# Patient Record
Sex: FEMALE | Race: WHITE | Employment: UNEMPLOYED | URBAN - METROPOLITAN AREA
[De-identification: names, ages, dates, MRNs, and addresses within clinical notes are randomized per-mention and may not be internally consistent; named-entity substitution may affect disease eponyms.]

---

## 2019-02-13 ENCOUNTER — HOSPITAL ENCOUNTER (INPATIENT)
Age: 29
LOS: 1 days | Discharge: HOME | DRG: 897 | End: 2019-02-14
Payer: COMMERCIAL

## 2019-02-13 PROBLEM — F32.9 MAJOR DEPRESSIVE DISORDER: Status: ACTIVE | Noted: 2019-02-13

## 2019-02-13 PROBLEM — F15.980: Status: ACTIVE | Noted: 2019-02-13

## 2019-02-13 PROBLEM — F33.9: Status: ACTIVE | Noted: 2019-02-13

## 2019-02-13 PROCEDURE — 74011250637 HC RX REV CODE- 250/637: Performed by: PSYCHIATRY & NEUROLOGY

## 2019-02-13 PROCEDURE — 65220000003 HC RM SEMIPRIVATE PSYCH

## 2019-02-13 PROCEDURE — 74011250637 HC RX REV CODE- 250/637: Performed by: INTERNAL MEDICINE

## 2019-02-13 PROCEDURE — 99284 EMERGENCY DEPT VISIT MOD MDM: CPT

## 2019-02-13 RX ORDER — BUDESONIDE AND FORMOTEROL FUMARATE DIHYDRATE 160; 4.5 UG/1; UG/1
2 AEROSOL RESPIRATORY (INHALATION) 2 TIMES DAILY
COMMUNITY

## 2019-02-13 RX ORDER — BUDESONIDE AND FORMOTEROL FUMARATE DIHYDRATE 160; 4.5 UG/1; UG/1
2 AEROSOL RESPIRATORY (INHALATION) 2 TIMES DAILY
Status: ON HOLD | COMMUNITY
End: 2019-02-13

## 2019-02-13 RX ORDER — ALBUTEROL SULFATE 90 UG/1
2 AEROSOL, METERED RESPIRATORY (INHALATION)
Status: DISCONTINUED | OUTPATIENT
Start: 2019-02-13 | End: 2019-02-14 | Stop reason: HOSPADM

## 2019-02-13 RX ORDER — BENZTROPINE MESYLATE 1 MG/ML
2 INJECTION INTRAMUSCULAR; INTRAVENOUS
Status: DISCONTINUED | OUTPATIENT
Start: 2019-02-13 | End: 2019-02-14 | Stop reason: HOSPADM

## 2019-02-13 RX ORDER — ADHESIVE BANDAGE
30 BANDAGE TOPICAL DAILY PRN
Status: DISCONTINUED | OUTPATIENT
Start: 2019-02-13 | End: 2019-02-14 | Stop reason: HOSPADM

## 2019-02-13 RX ORDER — BENZTROPINE MESYLATE 2 MG/1
2 TABLET ORAL
Status: DISCONTINUED | OUTPATIENT
Start: 2019-02-13 | End: 2019-02-14 | Stop reason: HOSPADM

## 2019-02-13 RX ORDER — FLUOXETINE HYDROCHLORIDE 20 MG/1
60 CAPSULE ORAL DAILY
Status: ON HOLD | COMMUNITY
End: 2019-02-13

## 2019-02-13 RX ORDER — FLUTICASONE PROPIONATE AND SALMETEROL 100; 50 UG/1; UG/1
1 POWDER RESPIRATORY (INHALATION)
Status: DISCONTINUED | OUTPATIENT
Start: 2019-02-13 | End: 2019-02-14 | Stop reason: HOSPADM

## 2019-02-13 RX ORDER — IBUPROFEN 400 MG/1
400 TABLET ORAL
Status: DISCONTINUED | OUTPATIENT
Start: 2019-02-13 | End: 2019-02-14 | Stop reason: HOSPADM

## 2019-02-13 RX ORDER — LORAZEPAM 2 MG/ML
2 INJECTION INTRAMUSCULAR
Status: DISCONTINUED | OUTPATIENT
Start: 2019-02-13 | End: 2019-02-14 | Stop reason: HOSPADM

## 2019-02-13 RX ORDER — FLUOXETINE HYDROCHLORIDE 20 MG/1
20 CAPSULE ORAL DAILY
Status: DISCONTINUED | OUTPATIENT
Start: 2019-02-13 | End: 2019-02-14 | Stop reason: HOSPADM

## 2019-02-13 RX ORDER — OLANZAPINE 5 MG/1
5 TABLET ORAL
Status: DISCONTINUED | OUTPATIENT
Start: 2019-02-13 | End: 2019-02-14 | Stop reason: HOSPADM

## 2019-02-13 RX ORDER — LORAZEPAM 1 MG/1
1 TABLET ORAL
Status: DISCONTINUED | OUTPATIENT
Start: 2019-02-13 | End: 2019-02-14 | Stop reason: HOSPADM

## 2019-02-13 RX ORDER — TRAMADOL HYDROCHLORIDE 50 MG/1
50 TABLET ORAL
COMMUNITY
End: 2019-02-14

## 2019-02-13 RX ORDER — ZOLPIDEM TARTRATE 10 MG/1
10 TABLET ORAL
Status: DISCONTINUED | OUTPATIENT
Start: 2019-02-13 | End: 2019-02-14 | Stop reason: HOSPADM

## 2019-02-13 RX ORDER — ACETAMINOPHEN 325 MG/1
650 TABLET ORAL
Status: DISCONTINUED | OUTPATIENT
Start: 2019-02-13 | End: 2019-02-14 | Stop reason: HOSPADM

## 2019-02-13 RX ORDER — ALBUTEROL SULFATE 90 UG/1
2 AEROSOL, METERED RESPIRATORY (INHALATION)
COMMUNITY

## 2019-02-13 RX ORDER — IBUPROFEN 200 MG
1 TABLET ORAL
Status: DISCONTINUED | OUTPATIENT
Start: 2019-02-13 | End: 2019-02-14 | Stop reason: HOSPADM

## 2019-02-13 RX ADMIN — LORAZEPAM 1 MG: 1 TABLET ORAL at 14:12

## 2019-02-13 RX ADMIN — FLUTICASONE PROPIONATE AND SALMETEROL 1 PUFF: 50; 100 POWDER RESPIRATORY (INHALATION) at 22:08

## 2019-02-13 RX ADMIN — ZOLPIDEM TARTRATE 10 MG: 10 TABLET ORAL at 22:12

## 2019-02-13 RX ADMIN — FLUOXETINE HYDROCHLORIDE 20 MG: 20 CAPSULE ORAL at 13:15

## 2019-02-13 RX ADMIN — LORAZEPAM 1 MG: 1 TABLET ORAL at 22:11

## 2019-02-13 RX ADMIN — FLUTICASONE PROPIONATE AND SALMETEROL 1 PUFF: 50; 100 POWDER RESPIRATORY (INHALATION) at 21:01

## 2019-02-13 NOTE — BH NOTES
Pt has been isolative to her room and to herself most of the day. Pt is depressed and is tearful. Pt denies SI, HI, hallucinations, and delusions. Pt denied pain/ discomfort. No behaviors or concerns at this time.
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
## 2019-02-13 NOTE — PROGRESS NOTES
Problem: Depressed Mood (Adult/Pediatric) Goal: *STG: Participates in treatment plan Outcome: Progressing Towards Goal 
Pt is calm and cooperative. Mostly isolative to self. Tearful. Pt states she has anxiety but declined to take medications. Pt states she miss her children. Pt is TDO and awaiting courts for hearing. Will continue to monitor pt and assess needs.

## 2019-02-13 NOTE — H&P
INITIAL PSYCHIATRIC EVALUATION   
   
 
IDENTIFICATION:   
Patient Name  Toni Painting Date of Birth 1990 [facility] [id number] Medical Record Number  [id number] Age  33 y.o. PCP None Admit date:  2/13/2019 Room Number  952/52  @ [facility]  
Date of Service  2/13/2019 HISTORY  
 
   
REASON FOR HOSPITALIZATION: 
CC: \"suicidal ideation\". Pt admitted under a temporary care home order (TDO) for suicidal ideations proving to be an imminent danger to self nd an inability to care for self. HISTORY OF PRESENT ILLNESS:   
The patient, Toni Painting, is a 33 y.o. WHITE OR  female with a past psychiatric history significant for generalized anxiety disorder and cannabis/amphetamine use disorder, who presents at this time with complaints of (and/or evidence of) the following emotional symptoms: depression and suicidal thoughts/threats. Additional symptomatology include escalation of drug use. The above symptoms have been present for 24+. These symptoms are of moderate to high severity. These symptoms are intermittent/ fleeting in nature. The patient's condition has been precipitated by psychosocial stressors. Patient's condition made worse by continued illicit drug use as well as treatment noncompliance. UDS: amphetamine, MJ; BAL=0. The patient is a air historian. The patient corroborates the above narrative. The patient contracts for safety on the unit and gives consent for the team to contact collateral. The patient is amenable to initiating treatment while on the unit. The patient reports that her primary issue has been a poor support system, she states that her mother is the reason she is in the hospital but also suggests that her  and she are having difficulties and she had decided to separate from him temporarily. Patient discharge focused, denies any thoughts of self harm. ALLERGIES:  
Allergies Allergen Reactions  Prednisone Unknown (comments)  Remeron [Mirtazapine] Unknown (comments)  Singulair [Montelukast] Unknown (comments) MEDICATIONS PRIOR TO ADMISSION:  
Medications Prior to Admission Medication Sig  FLUoxetine (PROZAC) 20 mg capsule Take 60 mg by mouth daily. PAST MEDICAL HISTORY:  
No past medical history on file. No past surgical history on file. SOCIAL HISTORY:  
Social History Socioeconomic History  Marital status:  Spouse name: Not on file  Number of children: Not on file  Years of education: Not on file  Highest education level: Not on file Social Needs  Financial resource strain: Not on file  Food insecurity - worry: Not on file  Food insecurity - inability: Not on file  Transportation needs - medical: Not on file  Transportation needs - non-medical: Not on file Occupational History  Not on file Tobacco Use  Smoking status: Not on file Substance and Sexual Activity  Alcohol use: Not on file  Drug use: Not on file  Sexual activity: Not on file Other Topics Concern  Not on file Social History Narrative  Not on file FAMILY HISTORY: History reviewed, pertinent family history as below:  
No family history on file. REVIEW OF SYSTEMS:  
Pertinent items are noted in the History of Present Illness. All other Systems reviewed and are considered negative. [facility] MENTAL STATUS EXAM (MSE): MSE FINDINGS ARE WITHIN NORMAL LIMITS (WNL) UNLESS OTHERWISE STATED BELOW. ( ALL OF THE BELOW CATEGORIES OF THE MSE HAVE BEEN REVIEWED (reviewed 2/13/2019) AND UPDATED AS DEEMED APPROPRIATE ) General Presentation age appropriate, evasive and guarded Orientation oriented to time, place and person Vital Signs  See below (reviewed 2/13/2019); Vital Signs (BP, Pulse, & Temp) are within normal limits if not listed below. Gait and Station Stable/steady, no ataxia Musculoskeletal System No extrapyramidal symptoms (EPS); no abnormal muscular movements or Tardive Dyskinesia (TD); muscle strength and tone are within normal limits Language No aphasia or dysarthria Speech:  normal volume and non-pressured Thought Processes logical; normal rate of thoughts; fair abstract reasoning/computation Thought Associations goal directed Thought Content preoccupations Suicidal Ideations contracts for safety Homicidal Ideations none Mood:  anxious , depressed and irritable Affect:  mood-congruent Memory recent  intact Memory remote:  intact Concentration/Attention:  intact Fund of Knowledge average Insight:  limited and poor Reliability poor Judgment:  poor VITALS:    
Patient Vitals for the past 24 hrs: 
 Temp Pulse Resp BP SpO2  
02/13/19 0838 97.9 °F (36.6 °C) 82 16 103/60 98 % 02/13/19 0701 98.2 °F (36.8 °C) 70 16 115/69 99 % Wt Readings from Last 3 Encounters:  
No data found for [employer] Temp Readings from Last 3 Encounters:  
02/13/19 97.9 °F (36.6 °C) BP Readings from Last 3 Encounters:  
02/13/19 103/60 Pulse Readings from Last 3 Encounters:  
02/13/19 82 DATA LABORATORY DATA: 
Labs Reviewed - No data to display No results found for any previous visit. RADIOLOGY REPORTS: 
No results found for this or any previous visit. No results found. MEDICATIONS ALL MEDICATIONS Current Facility-Administered Medications Medication Dose Route Frequency  ziprasidone (GEODON) 20 mg in sterile water (preservative free) 1 mL injection  20 mg IntraMUSCular BID PRN  
 OLANZapine (ZyPREXA) tablet 5 mg  5 mg Oral Q6H PRN  
 benztropine (COGENTIN) tablet 2 mg  2 mg Oral BID PRN  
 benztropine (COGENTIN) injection 2 mg  2 mg IntraMUSCular BID PRN  
 LORazepam (ATIVAN) injection 2 mg  2 mg IntraMUSCular Q4H PRN  
 LORazepam (ATIVAN) tablet 1 mg  1 mg Oral Q4H PRN  
  zolpidem (AMBIEN) tablet 10 mg  10 mg Oral QHS PRN  
 acetaminophen (TYLENOL) tablet 650 mg  650 mg Oral Q4H PRN  
 ibuprofen (MOTRIN) tablet 400 mg  400 mg Oral Q8H PRN  
 magnesium hydroxide (MILK OF MAGNESIA) 400 mg/5 mL oral suspension 30 mL  30 mL Oral DAILY PRN  
 nicotine (NICODERM CQ) 21 mg/24 hr patch 1 Patch  1 Patch TransDERmal DAILY PRN  
  
SCHEDULED MEDICATIONS Current Facility-Administered Medications Medication Dose Route Frequency ASSESSMENT & PLAN The patient, Obey Evans, is a 33 y.o.  female who presents at this time for treatment of the following diagnoses: 
Patient Active Hospital Problem List: Amphetamine induced depressive disorder Assessment: patient with a history of unstable relationships, familial stressors, provocative statements, escalating substance use. Suspect underlying borderline personality disorder with a substance induced mood disorder. Will obtain collateral, observe for now, restart SSRI. Plan: 
- START Prozac 20 mg QDAY for depression 
- IGM therapy as tolerated - Expand database / obtain collateral 
- Dispo planning A coordinated, multidisplinary treatment team (includes the nurse, unit pharmacist,  and writer) round was conducted for this initial evaluation with the patient present. The following regarding medications was addressed during rounds with patient: the risks and benefits of the proposed medication. The patient was given the opportunity to ask questions. Informed consent given to the use of the above medications. I will continue to adjust psychiatric and non-psychiatric medications (see above \"medication\" section and orders section for details) as deemed appropriate & based upon diagnoses and response to treatment. I have reviewed admission (and previous/old) labs and medical tests in the EHR and or transferring hospital documents.  I will continue to order blood tests/labs and diagnostic tests as deemed appropriate and review results as they become available (see orders for details). I have reviewed old psychiatric and medical records available in the EHR. I Will order additional psychiatric records from other institutions to further elucidate the nature of patient's psychopathology and review once available. I will gather additional collateral information from friends, family and o/p treatment team to further elucidate the nature of patient's psychopathology and baselline level of psychiatric functioning. ESTIMATED LENGTH OF STAY:  2-3 days STRENGTHS: 
Exercising self-direction/Resourceful, Interpersonal/supportive relationships (family, friends, peers) and Knowledge of medications SIGNED:   
Adina Hoyos MD 
2/13/2019

## 2019-02-13 NOTE — H&P
History and Physical 
 
Subjective: Baljeet Sellers is a 33 y.o. female with past medical hx significant for Fibromyalgia, RLS, Asthma, possible Narcolepsy, ? Self mutilating behavior. Per psych documentation, Pt admitted under a temporary prison order (TDO) for suicidal ideations proving to be an imminent danger to self nd an inability to care for self. WHITE OR  female with a past psychiatric history significant for generalized anxiety disorder and cannabis/amphetamine use disorder, who presents at this time with complaints of (and/or evidence of) the following emotional symptoms: depression and suicidal thoughts/threats. Additional symptomatology include escalation of drug use. The above symptoms have been present for 24+. These symptoms are of moderate to high severity. These symptoms are intermittent/ fleeting in nature. The patient's condition has been precipitated by psychosocial stressors. Patient's condition made worse by continued illicit drug use as well as treatment noncompliance. UDS: amphetamine, MJ; BAL=0. Pt is taking S/A beta 2 agonist and ICOS/L/A Beta 2 Agonist for Asthma which she is claiming is persistent. She is working with her PCP for RLS. She claims top be undergoing a sleep study for Narcolepsy. PMHx: RLS, Depression, asthma, ? Narcolepsy, Depression, Panic attacks PSHx: Cholecystectomy, wisdom tooth removal, FHx: DM, colon cancer, Breast cancer, Thyroid cancer, HTN, CVA, MI 
 
Social History Tobacco Use  Smoking status: 2 ppd Substance Use Topics  Alcohol use: occasionall Eugena Fester Prior to Admission medications Medication Sig Start Date End Date Taking? Authorizing Provider  
albuterol (PROVENTIL HFA, VENTOLIN HFA, PROAIR HFA) 90 mcg/actuation inhaler Take 2 Puffs by inhalation every six (6) hours as needed for Wheezing or Shortness of Breath.    Yes Provider, Historical  
 budesonide-formoterol (SYMBICORT) 160-4.5 mcg/actuation HFAA Take 2 Puffs by inhalation two (2) times a day. Yes Provider, Historical  
traMADol (ULTRAM) 50 mg tablet Take 50 mg by mouth every twelve (12) hours as needed for Pain. Yes Provider, Historical  
 
Allergies Allergen Reactions  Prednisone Unknown (comments)  Remeron [Mirtazapine] Unknown (comments)  Singulair [Montelukast] Unknown (comments) Review of Systems: 
Constitutional: negative Eyes: negative Ears, Nose, Mouth, Throat, and Face: negative Respiratory: negative Cardiovascular: negative Gastrointestinal: negative Genitourinary:negative Integument/Breast: negative Hematologic/Lymphatic: negative Musculoskeletal:negative Neurological: RLS Behavioral/Psychiatric:Depression Endocrine: negative Allergic/Immunologic: negative Objective: Intake and Output:   
No intake/output data recorded. No intake/output data recorded. Physical Exam:  
Visit Vitals /60 (BP 1 Location: Right arm, BP Patient Position: At rest) Pulse 82 Temp 97.9 °F (36.6 °C) Resp 16 SpO2 98% Breastfeeding? No  
 
General:  Alert, cooperative, no distress, appears stated age. Head:  Normocephalic, without obvious abnormality, atraumatic. Scarring of lower face Eyes:  Conjunctivae/corneas clear. PERRL, EOMs intact. Ears:  Normal external ear canals both ears. Nose: Nares normal. Septum midline. Mucosa normal. No drainage or sinus tenderness. Throat: Lips, mucosa, and tongue normal. Teeth and gums normal.  
Neck: Supple, symmetrical, trachea midline, no adenopathy, thyroid: no enlargement/tenderness/nodules, no carotid bruit and no JVD. Back:   Symmetric, no curvature. ROM normal. No CVA tenderness. Lungs:   Very occasional wheezes Chest wall:  No tenderness or deformity. Heart:  Regular rate and rhythm, S1, S2 normal, no murmur, click, rub or gallop. Abdomen:   Soft, non-tender. Bowel sounds normal. No masses,  No organomegaly. Extremities: Extremities normal, atraumatic, no cyanosis or edema. Pulses: Distal pulses intact. Skin: Scarring of lower face area. Scarring of jaw Lymph nodes: No cervical or supraclavicular adenopathy. Neurologic: CNII-XII intact. Normal strength, sensation intact, reflexes 2/4. Data Review: No results found for this or any previous visit (from the past 24 hour(s)). Assessment:  
 
Principal Problem: Amphetamine-induced anxiety disorder ([facility] (2/13/2019) Active Problems: 
  Major depressive disorder, recurrent, with postpartum onset ([facility] (2/13/2019) Major depressive disorder (2/13/2019) Asthma persistent with frequent nocturan symptoms RLS ? Narcolepsy Fibromyalgia Plan:  
 
Cont Albuteral/ICOS/L/A beta 2 agonist 
 
Will avoid any treatment with Dopamine agonist due psych issues for RLS Tylenol or Ibuprofen for Fibromyalgia Narcolepsy diagnosis not final 
 
No VTE prophylaxis indicated or necessary at this time. Signed By: Naomy Jewell MD   
 February 13, 2019

## 2019-02-13 NOTE — PROGRESS NOTES
[facility] Medication Reconciliation Recommendations/Findings:  
1) Patient does not use Symbicort as prescribed stating that she uses it \"when needed. \" Symbicort last filled in November 2018. 2) Please consider alternate pain medication other than tramadol due to patient's history of substance abuse. 3) Patient reports being off fluoxetine x 1 month. Of note, [facility] pharmacy processed a fluoxetine prescription yesterday with instructions 20 mg daily x 1 week, 40 mg daily x 1 week, then 60 mg daily. 4) Patient's pharmacy updated to [facility] pharmacy in [place], [place]. The following changes were made to the PTA medication list:  
? Additions: albuterol inhaler, budesonide-formoterol, tramadol ? Modifications: N/A 
? Deletions: fluoxetine Total Time Spent: 10 minutes Information obtained from: Patient, [facility] pharmacy ([phone]) Past Medical History/Disease States: No past medical history on file. Patient allergies: Allergies as of 02/13/2019 - Review Complete 02/13/2019 Allergen Reaction Noted  Prednisone Unknown (comments) 02/13/2019  Remeron [mirtazapine] Unknown (comments) 02/13/2019  Singulair [montelukast] Unknown (comments) 02/13/2019 Prior to Admission Medications Prescriptions Last Dose Informant Patient Reported? Taking? albuterol (PROVENTIL HFA, VENTOLIN HFA, PROAIR HFA) 90 mcg/actuation inhaler   Yes Yes Sig: Take 2 Puffs by inhalation every six (6) hours as needed for Wheezing or Shortness of Breath. budesonide-formoterol (SYMBICORT) 160-4.5 mcg/actuation [facility] December  Yes Yes Sig: Take 2 Puffs by inhalation two (2) times a day. traMADol (ULTRAM) 50 mg tablet   Yes Yes Sig: Take 50 mg by mouth every twelve (12) hours as needed for Pain. Facility-Administered Medications: None Thank you, Oliver James, PHARMD, BCPS
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
## 2019-02-14 VITALS
DIASTOLIC BLOOD PRESSURE: 58 MMHG | TEMPERATURE: 98.2 F | HEART RATE: 70 BPM | BODY MASS INDEX: 20.83 KG/M2 | HEIGHT: 64 IN | SYSTOLIC BLOOD PRESSURE: 101 MMHG | OXYGEN SATURATION: 99 % | RESPIRATION RATE: 16 BRPM | WEIGHT: 122 LBS

## 2019-02-14 LAB
CHOLEST SERPL-MCNC: 183 MG/DL
GLUCOSE P FAST SERPL-MCNC: 97 MG/DL (ref 65–100)
HDLC SERPL-MCNC: 66 MG/DL
HDLC SERPL: 2.8 {RATIO} (ref 0–5)
LDLC SERPL CALC-MCNC: 99 MG/DL (ref 0–100)
LIPID PROFILE,FLP: NORMAL
TRIGL SERPL-MCNC: 90 MG/DL (ref ?–150)
TSH SERPL DL<=0.05 MIU/L-ACNC: 0.84 UIU/ML (ref 0.36–3.74)
VLDLC SERPL CALC-MCNC: 18 MG/DL

## 2019-02-14 PROCEDURE — 74011250637 HC RX REV CODE- 250/637: Performed by: PSYCHIATRY & NEUROLOGY

## 2019-02-14 PROCEDURE — 80061 LIPID PANEL: CPT

## 2019-02-14 PROCEDURE — 36415 COLL VENOUS BLD VENIPUNCTURE: CPT

## 2019-02-14 PROCEDURE — 84443 ASSAY THYROID STIM HORMONE: CPT

## 2019-02-14 PROCEDURE — 82947 ASSAY GLUCOSE BLOOD QUANT: CPT

## 2019-02-14 RX ORDER — FLUOXETINE HYDROCHLORIDE 20 MG/1
20 CAPSULE ORAL DAILY
Qty: 14 CAP | Refills: 1 | Status: SHIPPED | OUTPATIENT
Start: 2019-02-15

## 2019-02-14 RX ADMIN — FLUTICASONE PROPIONATE AND SALMETEROL 1 PUFF: 50; 100 POWDER RESPIRATORY (INHALATION) at 08:53

## 2019-02-14 RX ADMIN — LORAZEPAM 1 MG: 1 TABLET ORAL at 08:56

## 2019-02-14 RX ADMIN — FLUOXETINE HYDROCHLORIDE 20 MG: 20 CAPSULE ORAL at 08:31

## 2019-02-14 NOTE — BH NOTES
Behavioral Health Transition Record to Provider Patient Name: Melo Maldonado YOB: 1990 Medical Record Number: [id number] Date of Admission: 2/13/2019 Date of Discharge: 2/14/2019 Attending Provider: Hussain Chou, * Discharging Provider: Edwar Villegas MD 
To contact this individual call [phone] and ask the  to page. If unavailable, ask to be transferred to [facility] Provider on call. [facility] Provider will be available on call 24/7 and during holidays. Primary Care Provider: None Allergies Allergen Reactions  Prednisone Unknown (comments)  Remeron [Mirtazapine] Unknown (comments)  Singulair [Montelukast] Unknown (comments) Reason for Admission: Pt transferred from [facility] and admitted on TDO. Pt was screened after pt's mother reported her daughter making suicidal threats on Face Book Messenger. Per prescreening pt had no plan but does have access to a gun. Pt reported to have been on Prozac several years but stopped taking the medication during last pregnancy. Pt reported to have restarted Prozac for depression but due to insurance issues she has not been taking it since December 2018. Pt reported to have both mental health and SA history. Pt's stressors to date are relationships, SA and medication non compliance.   
  
 
Admission Diagnosis: Major depressive disorder [F32.9] * No surgery found * Results for orders placed or performed during the hospital encounter of 02/13/19 TSH 3RD GENERATION Result Value Ref Range TSH 0.84 0.36 - 3.74 uIU/mL  
LIPID PANEL Result Value Ref Range LIPID PROFILE Cholesterol, total 183 <200 MG/DL Triglyceride 90 <150 MG/DL  
 HDL Cholesterol 66 MG/DL  
 LDL, calculated 99 0 - 100 MG/DL VLDL, calculated 18 MG/DL  
 CHOL/HDL Ratio 2.8 0 - 5.0 GLUCOSE, FASTING Result Value Ref Range  Glucose 97 65 - 100 MG/DL  
 
 
 Immunizations administered during this encounter: There is no immunization history on file for this patient. Screening for Metabolic Disorders for Patients on Antipsychotic Medications 
(Data obtained from the EMR) Estimated Body Mass Index Estimated body mass index is 20.94 kg/m² as calculated from the following: 
  Height as of this encounter: 5' 4\" (1.626 m). Weight as of this encounter: 55.3 kg (122 lb). Vital Signs/Blood Pressure Visit Vitals /58 Pulse 70 Temp 98.2 °F (36.8 °C) Resp 16 Ht 5' 4\" (1.626 m) Wt 55.3 kg (122 lb) SpO2 99% Breastfeeding? No  
BMI 20.94 kg/m² Blood Glucose/Hemoglobin A1c Lab Results Component Value Date/Time Glucose 97 02/14/2019 05:45 AM  
 
No results found for: HBA1C, HGBE8, ECZ2HTOG Lipid Panel Lab Results Component Value Date/Time Cholesterol, total 183 02/14/2019 05:45 AM  
 HDL Cholesterol 66 02/14/2019 05:45 AM  
 LDL, calculated 99 02/14/2019 05:45 AM  
 Triglyceride 90 02/14/2019 05:45 AM  
 CHOL/HDL Ratio 2.8 02/14/2019 05:45 AM  
 
  
Discharge Diagnosis: Please refer to psychiatrist's note. Discharge Plan: Social Work-Pt will be discharged today. Pt is alert and oriented. Pt denies SI/HI. Pt is free of delusions. Pt's mood is euthymic and smiling. Pt's thought process is logical and goal oriented on being discharged and getting back to her 3 children. Pt will be discharged with prescription(s). Pt will be picked up by  to go home at discharge. Pt scheduled with Dr. [facility] clinic) 2/21/2019 @ 9:00am to establish outpatient psychiatry services. Pt scheduled at [facility]) for 2/19/2019 @ 8:30am/open access to establish outpatient SA services. Pt advised to attend Narcotics Anonymous and has been provided with [facility] meeting list. Pt is in agreement with the plan.  [facility] care plan will be faxed electronically via [facility] to Dr. Alayna Mayes at (per Ronnie Bauer) [facility]#[phone]. Discharge Medication List and Instructions:  
Current Discharge Medication List  
  
 
 
Unresulted Labs (24h ago, onward) None To obtain results of studies pending at discharge, please contact [phone] Follow-up Information Follow up With Specialties Details Why Contact Info [address]  On 2/21/2019 Follow up  2/21/2019 @ 9:00am with Dr. Alayna Mayes for outpatient psychiatry services. *please bring id, insurance card & medications [facility], [address] 
[phone] 
[facility]#[phone] Narcotics Annonymous  On 2/14/2019 Follow up 2/14/2019 to NA meeting in area to continue sobriety. Marv/[id number] NA Meeting List Provided [facility]  On 2/19/2019 Follow up 2/19/2019 @ 8:30am to open access (Khang Srivastava, Thurs/8:30-11:30am) to establish outpatient SA therapy services. *please bring id, insurance card and medications. [address] 
[phone] Advanced Directive:  
Does the patient have an appointed surrogate decision maker? Yes Does the patient have a Medical Advance Directive? No 
Does the patient have a Psychiatric Advance Directive? No 
If the patient does not have a surrogate or Medical Advance Directive AND Psychiatric Advance Directive, the patient was offered information on these advance directives Patient will complete at a later time Patient Instructions: Please continue all medications until otherwise directed by physician. N/A Tobacco Cessation Discharge Plan:  
Is the patient a smoker and needs referral for smoking cessation? Yes Patient referred to the following for smoking cessation with an appointment? No    
Patient was offered medication to assist with smoking cessation at discharge? No 
Was education for smoking cessation added to the discharge instructions?  Yes 
 
 Alcohol/Substance Abuse Discharge Plan:  
Does the patient have a history of substance/alcohol abuse and requires a referral for treatment? Yes Patient referred to the following for substance/alcohol abuse treatment with an appointment? Yes Patient was offered medication to assist with alcohol cessation at discharge? No 
Was education for substance/alcohol abuse added to discharge instructions? Yes Patient discharged to Home; provided to the patient/caregiver either in hard copy or electronically. Jerri Vicente, Resident In Counseling

## 2019-02-14 NOTE — DISCHARGE INSTRUCTIONS
Patient Education        Depression and Chronic Disease: Care Instructions  Your Care Instructions    A chronic disease is one that you have for a long time. Some chronic diseases can be controlled, but they usually cannot be cured. Depression is common in people with chronic diseases, but it often goes unnoticed. Many people have concerns about seeking treatment for a mental health problem. You may think it's a sign of weakness, or you don't want people to know about it. It's important to overcome these reasons for not seeking treatment. Treating depression or anxiety is good for your health. Follow-up care is a key part of your treatment and safety. Be sure to make and go to all appointments, and call your doctor if you are having problems. It's also a good idea to know your test results and keep a list of the medicines you take. How can you care for yourself at home? Watch for symptoms of depression  The symptoms of depression are often subtle at first. You may think they are caused by your disease rather than depression. Or you may think it is normal to be depressed when you have a chronic disease. If you are depressed you may:  · Feel sad or hopeless. · Feel guilty or worthless. · Not enjoy the things you used to enjoy. · Feel hopeless, as though life is not worth living. · Have trouble thinking or remembering. · Have low energy, and you may not eat or sleep well. · Pull away from others. · Think often about death or killing yourself. (Keep the numbers for these national suicide hotlines: 8-771-074-TALK [1-333.862.8945] and 2-052-HDEWBUC [1-932.522.9616]. )  Get treatment  By treating your depression, you can feel more hopeful and have more energy. If you feel better, you may take better care of yourself, so your health may improve. · Talk to your doctor if you have any changes in mood during treatment for your disease. · Ask your doctor for help.  Counseling, antidepressant medicine, or a combination of the two can help most people with depression. Often a combination works best. Counseling can also help you cope with having a chronic disease. When should you call for help? Call 911 anytime you think you may need emergency care. For example, call if:    · You feel like hurting yourself or someone else.     · Someone you know has depression and is about to attempt or is attempting suicide.   [facility] your doctor now or seek immediate medical care if:    · You hear voices.     · Someone you know has depression and:  ? Starts to give away his or her possessions. ? Uses illegal drugs or drinks alcohol heavily. ? Talks or writes about death, including writing suicide notes or talking about guns, knives, or pills. ? Starts to spend a lot of time alone. ? Acts very aggressively or suddenly appears calm.    Watch closely for changes in your health, and be sure to contact your doctor if:    · You do not get better as expected. Where can you learn more? Go to http://erik-darrell.info/. Enter R184 in the search box to learn more about \"Depression and Chronic Disease: Care Instructions. \"  Current as of: September 11, 2018  Content Version: 11.9  © 8413-2443 [employer]. Care instructions adapted under license by [employer] (which disclaims liability or warranty for this information). If you have questions about a medical condition or this instruction, always ask your healthcare professional. [place] [address] any warranty or liability for your use of this information.        [address]  [phone]  [place] [phone]  [facility]-  [phone]    If I feel I am at risk of hurting myself or others, I will call the crisis office and speak with a crisis worker who will assist me during my crisis.

## 2019-02-14 NOTE — BH NOTES
D/C paperwork was reviewed with the patient. Questions were encouraged and answered appropriately. All belongings, scripts, and valuables was in patients possession upon leaving the unit.

## 2019-02-14 NOTE — PROGRESS NOTES
Problem: Suicide/Homicide (Adult/Pediatric) Goal: *STG: Remains safe in hospital 
Outcome: Progressing Towards Goal 
Pt reports increasing anxiety at the beginning of shift. Pt's family member visited during the shift. Pt is med/diet compliant. Pt denies active SI to writer indicating she was overwhelmed because she had stopped taking her prescribed Prozac medication because of insurance problems. Pt also indicated that she became overwhelmed due to increasing family stressor from  who she claimed to have been  from and not living with presently. Pt observed resting for most part of the shift with eyes closed. Respirations even and unlabored with no acute distress. Pt received prn Ambien. Will continue to monitor for health and safety Pt slept for 6.5 hours.

## 2019-02-14 NOTE — DISCHARGE SUMMARY
PSYCHIATRIC DISCHARGE SUMMARY         IDENTIFICATION:    Patient Name  Cdoy Allen   Date of Birth 1990   [facility] [id number]   Medical Record Number  [id number]      Age  33 y.o. PCP None   Admit date:  2/13/2019    Discharge date: 2/14/2019   Room Number  308/02  @ [facility]   Date of Service  2/14/2019            TYPE OF DISCHARGE: REGULAR               CONDITION AT DISCHARGE: improved       PROVISIONAL & DISCHARGE DIAGNOSES:    Problem List  Never Reviewed          Codes Class    * (Principal) Amphetamine-induced anxiety disorder ([facility] ICD-10-CM: F15.980  ICD-9-CM: 292.89, E980.3         Major depressive disorder, recurrent, with postpartum onset ([facility] ICD-10-CM: O99.345, F33.9  ICD-9-CM: 648.44, 296.30         Major depressive disorder ICD-10-CM: F32.9  ICD-9-CM: 296.20               Active Hospital Problems    *Amphetamine-induced anxiety disorder ([facility]      Major depressive disorder, recurrent, with postpartum onset ([facility]      Major depressive disorder        DISCHARGE DIAGNOSIS:   Axis I:  SEE ABOVE  Axis II: SEE ABOVE  Axis III: SEE ABOVE  Axis IV:  lack of structure  Axis V:  20 on admission, 40 on discharge     CC & HISTORY OF PRESENT ILLNESS:  \"sucidal ideation\"    The patient, Cody Allen, is a 33 y.o. WHITE OR  female with a past psychiatric history significant for generalized anxiety disorder and cannabis/amphetamine use disorder, who presents at this time with complaints of (and/or evidence of) the following emotional symptoms: depression and suicidal thoughts/threats. Additional symptomatology include escalation of drug use. The above symptoms have been present for 24+. These symptoms are of moderate to high severity. These symptoms are intermittent/ fleeting in nature. The patient's condition has been precipitated by psychosocial stressors. Patient's condition made worse by continued illicit drug use as well as treatment noncompliance.  UDS: amphetamine, MJ; BAL=0.      The patient is a fair historian. The patient corroborates the above narrative. The patient contracts for safety on the unit and gives consent for the team to contact collateral. The patient is amenable to initiating treatment while on the unit. The patient reports that her primary issue has been a poor support system, she states that her mother is the reason she is in the hospital but also suggests that her  and she are having difficulties and she had decided to separate from him temporarily. Patient discharge focused, denies any thoughts of self harm.        SOCIAL HISTORY:    Social History     Socioeconomic History    Marital status:      Spouse name: Not on file    Number of children: Not on file    Years of education: Not on file    Highest education level: Not on file   Social Needs    Financial resource strain: Not on file    Food insecurity - worry: Not on file    Food insecurity - inability: Not on file   [employer] needs - medical: Not on file   [employer] needs - non-medical: Not on file   Occupational History    Not on file   Tobacco Use    Smoking status: Not on file   Substance and Sexual Activity    Alcohol use: Not on file    Drug use: Not on file    Sexual activity: Not on file   Other Topics Concern    Not on file   Social History Narrative    Not on file      FAMILY HISTORY:   No family history on file. HOSPITALIZATION COURSE:    Casi Hua was admitted to the inpatient psychiatric unit [facility] for acute psychiatric stabilization in regards to symptomatology as described in the HPI above. The differential diagnosis at time of admission included: amphetamine induced anxiety disorder vs adjustment disorder. While on the unit Casi Hua was involved in individual, group, occupational and milieu therapy. Psychiatric medications were adjusted during this hospitalization including Prozac. Marie Bowling demonstrated a slow, but progressive improvement in overall condition. Much of patient's initial presentation appeared to be related to effects of drugs of abuse. Please see individual progress notes for more specific details regarding patient's hospitalization course. Patient was offered and declined inpatient rehabilitation. At time of discharge, Marie Bowling is without significant problems of depression, psychosis, or ravi. Patient free of suicidal and homicidal ideations (appears to be at very low risk of suicide or homicide) and reports many positive predictive factors in terms of not attempting suicide or homicide. Overall presentation at time of discharge is most consistent with the diagnosis of amphetamine use disorder with amphetamine induced anxiety disoder. Patient has maximized benefit to be derived from acute inpatient psychiatric treatment. All members of the treatment team concur with each other in regards to plans for discharge today. Patient and family are aware and in agreement with discharge and discharge plan. LABS AND IMAGAING:    Labs Reviewed   TSH 3RD GENERATION   LIPID PANEL   GLUCOSE, FASTING     No results found for: DS35, PHEN, PHENO, PHENT, DILF, DS39, PHENY, PTN, VALF2, VALAC, VALP, VALPR, DS6, CRBAM, CRBAMP, CARB2, XCRBAM  Admission on 02/13/2019, Discharged on 02/14/2019   Component Date Value Ref Range Status    TSH 02/14/2019 0.84  0.36 - 3.74 uIU/mL Final    LIPID PROFILE 02/14/2019        Final    Cholesterol, total 02/14/2019 183  <200 MG/DL Final    Triglyceride 02/14/2019 90  <150 MG/DL Final    HDL Cholesterol 02/14/2019 66  MG/DL Final    LDL, calculated 02/14/2019 99  0 - 100 MG/DL Final    VLDL, calculated 02/14/2019 18  MG/DL Final    CHOL/HDL Ratio 02/14/2019 2.8  0 - 5.0   Final    Glucose 02/14/2019 97  65 - 100 MG/DL Final     No results found. DISPOSITION:    Home.  Patient to f/u with drug/etoh rehabilitation, psychiatric, and psychotherapy appointments. Patient is to f/u with internist as directed. FOLLOW-UP CARE:    Activity as tolerated  Regular diet  Wound Care: none needed. Follow-up Information     Follow up With Specialties Details Why [address]  On 2/21/2019 Follow up  2/21/2019 @ 9:00am with Dr. Eloina Ruggiero for outpatient psychiatry services. *please bring id, insurance card & medications 11 [facility], [address]  [phone]  [facility]#[phone]    Narcotics Annonymous  On 2/14/2019 Follow up 2/14/2019 to NA meeting in area to continue sobriety. Marv/[id number] NA Meeting List Provided    [facility]  On 2/19/2019 Follow up 2/19/2019 @ 8:30am to open access (Khang Srivastava, Thurs/8:30-11:30am) to establish outpatient SA therapy services. *please bring id, insurance card and medications. [facility]  [phone]    None    None (289) Patient stated that they have no PCP                   PROGNOSIS:   Poor---- based on nature of patient's pathology/ies and treatment compliance issues. Prognosis is greatly dependent upon patient's ability to remain sober and to follow up with scheduled appointments as well as to comply with psychiatric medications as prescribed. DISCHARGE MEDICATIONS:     Informed consent given for the use of following psychotropic medications:  Discharge Medication List as of 2/14/2019  3:04 PM      START taking these medications    Details   FLUoxetine (PROZAC) 20 mg capsule Take 1 Cap by mouth daily. , Print, Disp-14 Cap, R-1         CONTINUE these medications which have NOT CHANGED    Details   albuterol (PROVENTIL HFA, VENTOLIN HFA, PROAIR HFA) 90 mcg/actuation inhaler Take 2 Puffs by inhalation every six (6) hours as needed for Wheezing or Shortness of Breath., Historical Med      budesonide-formoterol (SYMBICORT) 160-4.5 mcg/actuation HFAA Take 2 Puffs by inhalation two (2) times a day., Historical Med         STOP taking these medications       traMADol (ULTRAM) 50 mg tablet Comments:   Reason for Stopping:                      A coordinated, multidisplinary treatment team round was conducted with Sukhwinder Rousseau---this is done daily here at [facility]. This team consists of the nurse, psychiatric unit pharmacist,  and Tapan Mcintosh. I have spent greater than 35 minutes on discharge work.     Signed:  Lavera Skiff, MD  2/14/2019

## 2019-02-14 NOTE — PROGRESS NOTES
Progress Note Patient: Sherry Bethea               Sex: female          DOA: 2/13/2019 YOB: 1990      Age:  33 y.o.        LOS:  LOS: 1 day Subjective / Interval Hx I Sherry Bethea is a 33 y.o. female  who is admitted to Psych with amphetamine induced anxiety . Objective:  
  
Visit Vitals /58 Pulse 70 Temp 98.2 °F (36.8 °C) Resp 16 Ht 5' 4\" (1.626 m) Wt 55.3 kg (122 lb) SpO2 99% Breastfeeding? No  
BMI 20.94 kg/m² Physical Exam  
Constitutional: She is oriented to person, place, and time. She appears well-developed and well-nourished. No distress. HENT:  
Head: Normocephalic and atraumatic. Eyes: Conjunctivae and EOM are normal. Pupils are equal, round, and reactive to light. No scleral icterus. Neck: Neck supple. Cardiovascular: Normal rate, regular rhythm and normal heart sounds. No murmur heard. Pulmonary/Chest: Effort normal and breath sounds normal. No respiratory distress. She has no wheezes. She has no rales. Abdominal: Soft. Bowel sounds are normal.  
Musculoskeletal: She exhibits no edema. Neurological: She is alert and oriented to person, place, and time. Skin: Skin is warm. She is not diaphoretic. Psychiatric: She has a normal mood and affect. Intake and Output: 
Current Shift:  No intake/output data recorded. Last three shifts:  No intake/output data recorded. Recent Results (from the past 48 hour(s)) TSH 3RD GENERATION Collection Time: 02/14/19  5:45 AM  
Result Value Ref Range TSH 0.84 0.36 - 3.74 uIU/mL  
LIPID PANEL Collection Time: 02/14/19  5:45 AM  
Result Value Ref Range LIPID PROFILE Cholesterol, total 183 <200 MG/DL Triglyceride 90 <150 MG/DL  
 HDL Cholesterol 66 MG/DL  
 LDL, calculated 99 0 - 100 MG/DL VLDL, calculated 18 MG/DL  
 CHOL/HDL Ratio 2.8 0 - 5.0 GLUCOSE, FASTING Collection Time: 02/14/19  5:45 AM  
Result Value Ref Range Glucose 97 65 - 100 MG/DL Lab/Data Reviewed: All lab results for the last 24 hours reviewed. XRays were reviewed in past 24 hours Medications Reviewed Assessment/Plan Principal Problem: Amphetamine-induced anxiety disorder ([facility] (2/13/2019) Active Problems: 
  Major depressive disorder, recurrent, with postpartum onset ([facility] (2/13/2019) Major depressive disorder (2/13/2019) Asthma persistent with frequent nocturan symptoms 
  
RLS 
  
  
Fibromyalgia Plan Cont Albuteral/ICOS/L/A beta 2 agonist 
  
Will avoid any treatment with Dopamine agonist due psych issues for RLS 
  
Tylenol or Ibuprofen for Fibromyalgia 
 
  
No VTE prophylaxis indicated or necessary at this time Patient stable from medical stand point. Will sign off today. I will be available to follow again if need arises. Zachary Martinez MD 
February 14, 2019